# Patient Record
Sex: MALE | Race: WHITE | NOT HISPANIC OR LATINO | URBAN - METROPOLITAN AREA
[De-identification: names, ages, dates, MRNs, and addresses within clinical notes are randomized per-mention and may not be internally consistent; named-entity substitution may affect disease eponyms.]

---

## 2017-09-16 ENCOUNTER — EMERGENCY (EMERGENCY)
Facility: HOSPITAL | Age: 82
LOS: 1 days | Discharge: HOME | End: 2017-09-16
Attending: EMERGENCY MEDICINE
Payer: MEDICARE

## 2017-09-16 VITALS
WEIGHT: 145.95 LBS | RESPIRATION RATE: 20 BRPM | SYSTOLIC BLOOD PRESSURE: 155 MMHG | DIASTOLIC BLOOD PRESSURE: 62 MMHG | TEMPERATURE: 99 F | OXYGEN SATURATION: 97 % | HEART RATE: 60 BPM

## 2017-09-16 VITALS
SYSTOLIC BLOOD PRESSURE: 151 MMHG | RESPIRATION RATE: 18 BRPM | DIASTOLIC BLOOD PRESSURE: 55 MMHG | HEART RATE: 60 BPM | TEMPERATURE: 99 F | OXYGEN SATURATION: 98 %

## 2017-09-16 LAB
ALBUMIN SERPL ELPH-MCNC: 4.2 G/DL — SIGNIFICANT CHANGE UP (ref 3.3–5)
ALP SERPL-CCNC: 60 U/L — SIGNIFICANT CHANGE UP (ref 40–120)
ALT FLD-CCNC: 13 U/L RC — SIGNIFICANT CHANGE UP (ref 10–45)
ANION GAP SERPL CALC-SCNC: 14 MMOL/L — SIGNIFICANT CHANGE UP (ref 5–17)
APTT BLD: 29.9 SEC — SIGNIFICANT CHANGE UP (ref 27.5–37.4)
AST SERPL-CCNC: 23 U/L — SIGNIFICANT CHANGE UP (ref 10–40)
BASOPHILS # BLD AUTO: 0.1 K/UL — SIGNIFICANT CHANGE UP (ref 0–0.2)
BASOPHILS NFR BLD AUTO: 0.8 % — SIGNIFICANT CHANGE UP (ref 0–2)
BILIRUB SERPL-MCNC: 0.4 MG/DL — SIGNIFICANT CHANGE UP (ref 0.2–1.2)
BUN SERPL-MCNC: 32 MG/DL — HIGH (ref 7–23)
CALCIUM SERPL-MCNC: 9.2 MG/DL — SIGNIFICANT CHANGE UP (ref 8.4–10.5)
CHLORIDE SERPL-SCNC: 105 MMOL/L — SIGNIFICANT CHANGE UP (ref 96–108)
CO2 SERPL-SCNC: 22 MMOL/L — SIGNIFICANT CHANGE UP (ref 22–31)
CREAT SERPL-MCNC: 2.15 MG/DL — HIGH (ref 0.5–1.3)
EOSINOPHIL # BLD AUTO: 0.1 K/UL — SIGNIFICANT CHANGE UP (ref 0–0.5)
EOSINOPHIL NFR BLD AUTO: 2.1 % — SIGNIFICANT CHANGE UP (ref 0–6)
GLUCOSE SERPL-MCNC: 97 MG/DL — SIGNIFICANT CHANGE UP (ref 70–99)
HCT VFR BLD CALC: 35.4 % — LOW (ref 39–50)
HGB BLD-MCNC: 11.9 G/DL — LOW (ref 13–17)
INR BLD: 1.12 RATIO — SIGNIFICANT CHANGE UP (ref 0.88–1.16)
LYMPHOCYTES # BLD AUTO: 1.5 K/UL — SIGNIFICANT CHANGE UP (ref 1–3.3)
LYMPHOCYTES # BLD AUTO: 20.5 % — SIGNIFICANT CHANGE UP (ref 13–44)
MCHC RBC-ENTMCNC: 33.8 GM/DL — SIGNIFICANT CHANGE UP (ref 32–36)
MCHC RBC-ENTMCNC: 33.8 PG — SIGNIFICANT CHANGE UP (ref 27–34)
MCV RBC AUTO: 100 FL — SIGNIFICANT CHANGE UP (ref 80–100)
MONOCYTES # BLD AUTO: 0.7 K/UL — SIGNIFICANT CHANGE UP (ref 0–0.9)
MONOCYTES NFR BLD AUTO: 9.3 % — SIGNIFICANT CHANGE UP (ref 2–14)
NEUTROPHILS # BLD AUTO: 4.8 K/UL — SIGNIFICANT CHANGE UP (ref 1.8–7.4)
NEUTROPHILS NFR BLD AUTO: 67.2 % — SIGNIFICANT CHANGE UP (ref 43–77)
PLATELET # BLD AUTO: 162 K/UL — SIGNIFICANT CHANGE UP (ref 150–400)
POTASSIUM SERPL-MCNC: 5.3 MMOL/L — SIGNIFICANT CHANGE UP (ref 3.5–5.3)
POTASSIUM SERPL-SCNC: 5.3 MMOL/L — SIGNIFICANT CHANGE UP (ref 3.5–5.3)
PROT SERPL-MCNC: 6.7 G/DL — SIGNIFICANT CHANGE UP (ref 6–8.3)
PROTHROM AB SERPL-ACNC: 12.1 SEC — SIGNIFICANT CHANGE UP (ref 9.8–12.7)
RBC # BLD: 3.54 M/UL — LOW (ref 4.2–5.8)
RBC # FLD: 12 % — SIGNIFICANT CHANGE UP (ref 10.3–14.5)
SODIUM SERPL-SCNC: 141 MMOL/L — SIGNIFICANT CHANGE UP (ref 135–145)
WBC # BLD: 7.1 K/UL — SIGNIFICANT CHANGE UP (ref 3.8–10.5)
WBC # FLD AUTO: 7.1 K/UL — SIGNIFICANT CHANGE UP (ref 3.8–10.5)

## 2017-09-16 PROCEDURE — 99284 EMERGENCY DEPT VISIT MOD MDM: CPT | Mod: 25

## 2017-09-16 PROCEDURE — 85610 PROTHROMBIN TIME: CPT

## 2017-09-16 PROCEDURE — 82272 OCCULT BLD FECES 1-3 TESTS: CPT

## 2017-09-16 PROCEDURE — 93010 ELECTROCARDIOGRAM REPORT: CPT

## 2017-09-16 PROCEDURE — 93005 ELECTROCARDIOGRAM TRACING: CPT

## 2017-09-16 PROCEDURE — 85730 THROMBOPLASTIN TIME PARTIAL: CPT

## 2017-09-16 PROCEDURE — 99284 EMERGENCY DEPT VISIT MOD MDM: CPT | Mod: 25,GC

## 2017-09-16 PROCEDURE — 80053 COMPREHEN METABOLIC PANEL: CPT

## 2017-09-16 PROCEDURE — 85027 COMPLETE CBC AUTOMATED: CPT

## 2017-09-16 RX ORDER — PANTOPRAZOLE SODIUM 20 MG/1
0 TABLET, DELAYED RELEASE ORAL
Qty: 0 | Refills: 0 | COMMUNITY

## 2017-09-16 RX ORDER — ATENOLOL 25 MG/1
1 TABLET ORAL
Qty: 0 | Refills: 0 | COMMUNITY

## 2017-09-16 RX ORDER — ISOSORBIDE MONONITRATE 60 MG/1
1 TABLET, EXTENDED RELEASE ORAL
Qty: 0 | Refills: 0 | COMMUNITY

## 2017-09-16 RX ORDER — BUMETANIDE 0.25 MG/ML
1 INJECTION INTRAMUSCULAR; INTRAVENOUS
Qty: 0 | Refills: 0 | COMMUNITY

## 2017-09-16 RX ORDER — ATORVASTATIN CALCIUM 80 MG/1
1 TABLET, FILM COATED ORAL
Qty: 0 | Refills: 0 | COMMUNITY

## 2017-09-16 RX ORDER — SODIUM CHLORIDE 9 MG/ML
1000 INJECTION INTRAMUSCULAR; INTRAVENOUS; SUBCUTANEOUS ONCE
Qty: 0 | Refills: 0 | Status: COMPLETED | OUTPATIENT
Start: 2017-09-16 | End: 2017-09-16

## 2017-09-16 RX ORDER — CLOPIDOGREL BISULFATE 75 MG/1
1 TABLET, FILM COATED ORAL
Qty: 0 | Refills: 0 | COMMUNITY

## 2017-09-16 RX ADMIN — SODIUM CHLORIDE 1000 MILLILITER(S): 9 INJECTION INTRAMUSCULAR; INTRAVENOUS; SUBCUTANEOUS at 22:09

## 2017-09-16 NOTE — ED PROVIDER NOTE - PHYSICAL EXAMINATION
Gen: NAD, AOx3, well appearing  Head: NCAT  HEENT: PERRL, oral mucosa moist, normal conjunctiva  Lung: CTAB, no respiratory distress  CV: rrr, no murmurs, Normal perfusion  Abd: soft, NTND, no CVA tenderness  Rectal: normal tone, brown stool, guaiac negative (chaperone ARABELLA Topete)  MSK: No edema, no visible deformities  Neuro: No focal neurologic deficits  Skin: No rash   - Florinda Allison, DO

## 2017-09-16 NOTE — ED ADULT NURSE NOTE - OBJECTIVE STATEMENT
89 y.o. Male presents to the ED c/o dark stools. 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 pt reports having diarrhea x6 weeks. pt states for the past 3 days, the stool has been "black." Pt reports having dark loose stools today x2. Denies CP, SOB, N/V, urinating, fever/chills. Ambulatory. A&Ox3. Pt is in no current distress. comfort and safety provided. son in law at bedside. 89 y.o. Male presents to the ED c/o dark stools. 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 Pt reports having diarrhea x6 weeks. Pt states for the past 3 days, the stool has been "black." Pt reports having dark loose stools today x2. Denies weakness, numbness, dizziness. Denies CP, SOB, N/V, urinating, fever/chills. Comfortable appearing. Ambulatory. A&Ox3. Pt is in no current distress. comfort and safety provided. son in law at bedside.

## 2017-09-16 NOTE — ED PROVIDER NOTE - PROGRESS NOTE DETAILS
Pt informed of elevated BUN/Creat - patient states his kidney function is always elevated between 1.6-2.4.  Will hydrate patient and contact PMD for further information.    - Florinda Allison DO PMD Dr. Sutherland informed us that patient has aortic stenosis, and  July 25th the patient's h/h 11./36.5, and creatinine was 1.67.  Creat 1.99 in January.  Dr. Sutherland thinks patient can go home and follow up with him in the office on Monday.    - Florinda Allison DO

## 2017-09-16 NOTE — ED ADULT NURSE NOTE - PMH
AICD (automatic cardioverter/defibrillator) present    CAD (coronary artery disease)    Gastritis    HTN (hypertension)

## 2017-09-16 NOTE — ED ADULT NURSE REASSESSMENT NOTE - NS ED NURSE REASSESS COMMENT FT1
Report taken from Jazmín BELL in [facility]. Pt resting in stretcher in NAD and VSS. Awaiting doctor's evaluation.

## 2017-09-16 NOTE — ED PROVIDER NOTE - ATTENDING CONTRIBUTION TO CARE
Patient with diarrhea and dark stools, but has taken kaopectate for diarrhea. No abdominal pain, patient states he has had 5/10 of his last bowel movements come out as dark. No fatigue. No chest pain no shortness of breath. No lightheadedness.   NAD, NCAT, MMM, Trachea midline, Normal conjunctiva, CTAB, Non-tachycardic, Normal perfusion, Soft, NTND, No rebound/guarding, No edema, No deformity of extremities, Appropriate, Cooperative, With capacity and insight, No rashes, CN grossly intact, Normal coordination, No focal motor or sensory deficits, guaiac negative  will get iv, labs, and reassess, noted elevated bun/cr called primary medical doctor who confirms history of Cr up to 2.4 and last at 1.7-1.9. Patient will get iv, fluids and  follow up with primary medical doctor this week.   No immediate life threatening issues present on history or clinical exam. Patient is a safe disposition home, has capacity and insight into their condition, ambulatory in the emergency department and will follow up with their doctor(s) this week. Patient and family  understand anticipatory guidance were given strict return and follow up precautions.  The patient and family have been informed of all concerning signs and symptoms to return to Emergency Department, the necessity to follow up with PMD/Clinic/follow up provided within 2-3 days was explained, and the patient and/or family reports understanding of above with capacity and insight.

## 2017-09-16 NOTE — ED PROVIDER NOTE - OBJECTIVE STATEMENT
89 M pmh HTN, bladder stones, CAD, AICD, stents on plavix p/w 3 days of diarrhea (10 episodes) about half of which were black.  Pt has abdominal pain at night described as "boiling" and intermittent left side pain.  No nausea/vomiting, fever/chills, chest pain, shortness of breath.  Pt was taking kopectate for the diarrhea.  No bright red blood in stool. 89 M pmh HTN, bladder stones, CAD, AICD, stents on plavix p/w 3 days of diarrhea (10 episodes) about half of which were black.  Pt has abdominal pain at night described as "boiling" and intermittent left side pain.  No nausea/vomiting, fever/chills, chest pain, shortness of breath.  Pt was taking kaopectate for the diarrhea.  No bright red blood in stool.  Pt was seen urgent care yesterday and asked to give stool sample which he delivered today.    PMD: Dr. Sutherland ([place])  - Florinda Allison DO
